# Patient Record
Sex: FEMALE | Race: WHITE | NOT HISPANIC OR LATINO | URBAN - METROPOLITAN AREA
[De-identification: names, ages, dates, MRNs, and addresses within clinical notes are randomized per-mention and may not be internally consistent; named-entity substitution may affect disease eponyms.]

---

## 2017-08-04 ENCOUNTER — APPOINTMENT (RX ONLY)
Dept: URBAN - METROPOLITAN AREA CLINIC 143 | Facility: CLINIC | Age: 57
Setting detail: DERMATOLOGY
End: 2017-08-04

## 2017-08-04 DIAGNOSIS — L71.8 OTHER ROSACEA: ICD-10-CM

## 2017-08-04 PROBLEM — I63.50 CEREBRAL INFARCTION DUE TO UNSPECIFIED OCCLUSION OR STENOSIS OF UNSPECIFIED CEREBRAL ARTERY: Status: ACTIVE | Noted: 2017-08-04

## 2017-08-04 PROBLEM — F41.9 ANXIETY DISORDER, UNSPECIFIED: Status: ACTIVE | Noted: 2017-08-04

## 2017-08-04 PROCEDURE — ? IN-HOUSE DISPENSING PHARMACY

## 2017-08-04 PROCEDURE — ? COUNSELING

## 2017-08-04 PROCEDURE — ? PRESCRIPTION

## 2017-08-04 PROCEDURE — 99202 OFFICE O/P NEW SF 15 MIN: CPT

## 2017-08-04 RX ORDER — IVERMECTIN 10 MG/G
CREAM TOPICAL QD
Qty: 1 | Refills: 1 | Status: ERX | COMMUNITY
Start: 2017-08-04

## 2017-08-04 RX ADMIN — IVERMECTIN: 10 CREAM TOPICAL at 21:40

## 2017-08-04 ASSESSMENT — LOCATION ZONE DERM: LOCATION ZONE: FACE

## 2017-08-04 ASSESSMENT — LOCATION SIMPLE DESCRIPTION DERM: LOCATION SIMPLE: LEFT CHEEK

## 2017-08-04 ASSESSMENT — LOCATION DETAILED DESCRIPTION DERM: LOCATION DETAILED: LEFT INFERIOR CENTRAL MALAR CHEEK

## 2017-08-04 NOTE — PROCEDURE: IN-HOUSE DISPENSING PHARMACY
Product 23 Units Dispensed: 0
Product 28 Unit Type: mg
Product 1 Application Directions: AAA QD
Product 1 Price/Unit (In Dollars): 40.00
Detail Level: Zone
Name Of Product 1: Rosacea cream
Send Charges To Patient Encounter: Yes
Product 1 Units Dispensed: 1

## 2017-08-04 NOTE — PROCEDURE: MIPS QUALITY
Quality 111:Pneumonia Vaccination Status For Older Adults: Pneumococcal Vaccination not Administered or Previously Received, Reason not Otherwise Specified
Quality 131: Pain Assessment And Follow-Up: Pain assessment using a standardized tool is documented as negative, no follow-up plan required
Quality 226: Preventive Care And Screening: Tobacco Use: Screening And Cessation Intervention: Patient screened for tobacco and never smoked
Quality 154 Part A: Falls: Risk Assessment (Should Be Reported With Measure 155.): Falls risk assessment completed and documented in the past 12 months.
Quality 431: Preventive Care And Screening: Unhealthy Alcohol Use - Screening: Patient screened for unhealthy alcohol use using a single question and scores less than 2 times per year
Detail Level: Detailed
Quality 110: Preventive Care And Screening: Influenza Immunization: Influenza Immunization Ordered or Recommended, but not Administered due to system reason
Quality 154 Part B: Falls: Risk Screening (Should Be Reported With Measure 155.): Patient screened for future fall risk; documentation of no falls in the past year or only one fall without injury in the past year
Quality 155 (Denominator): Falls Plan Of Care: Plan of Care not Documented, Reason not Otherwise Specified
Quality 47: Advance Care Plan: Advance Care Planning discussed and documented in the medical record; patient did not wish or was not able to name a surrogate decision maker or provide an advance care plan.

## 2021-03-17 ENCOUNTER — NEW PATIENT (OUTPATIENT)
Dept: URBAN - NONMETROPOLITAN AREA CLINIC 13 | Facility: CLINIC | Age: 61
End: 2021-03-17
Payer: COMMERCIAL

## 2021-03-17 DIAGNOSIS — H52.223 REGULAR ASTIGMATISM, BILATERAL: Primary | ICD-10-CM

## 2021-03-17 PROCEDURE — 92004 COMPRE OPH EXAM NEW PT 1/>: CPT | Performed by: OPTOMETRIST

## 2021-03-17 ASSESSMENT — VISUAL ACUITY
OS: 20/30
OD: 20/25

## 2021-03-17 ASSESSMENT — INTRAOCULAR PRESSURE
OS: 17
OD: 17

## 2021-09-02 ENCOUNTER — OFFICE VISIT (OUTPATIENT)
Dept: URBAN - NONMETROPOLITAN AREA CLINIC 13 | Facility: CLINIC | Age: 61
End: 2021-09-02
Payer: COMMERCIAL

## 2021-09-02 DIAGNOSIS — H50.9 STRABISMUS: ICD-10-CM

## 2021-09-02 DIAGNOSIS — H25.12 AGE-RELATED NUCLEAR CATARACT, LEFT EYE: ICD-10-CM

## 2021-09-02 DIAGNOSIS — H35.363 DRUSEN (DEGENERATIVE) OF MACULA, BILATERAL: ICD-10-CM

## 2021-09-02 DIAGNOSIS — H04.123 TEAR FILM INSUFFICIENCY OF BILATERAL LACRIMAL GLANDS: ICD-10-CM

## 2021-09-02 PROCEDURE — 99204 OFFICE O/P NEW MOD 45 MIN: CPT | Performed by: OPHTHALMOLOGY

## 2021-09-02 ASSESSMENT — VISUAL ACUITY
OD: 20/30
OS: 20/30

## 2021-09-02 ASSESSMENT — INTRAOCULAR PRESSURE
OS: 18
OD: 17

## 2021-10-11 ENCOUNTER — ADULT PHYSICAL (OUTPATIENT)
Dept: URBAN - NONMETROPOLITAN AREA CLINIC 13 | Facility: CLINIC | Age: 61
End: 2021-10-11
Payer: COMMERCIAL

## 2021-10-11 DIAGNOSIS — Z01.818 ENCOUNTER FOR OTHER PREPROCEDURAL EXAMINATION: Primary | ICD-10-CM

## 2021-10-11 DIAGNOSIS — H25.13 AGE-RELATED NUCLEAR CATARACT, BILATERAL: ICD-10-CM

## 2021-10-11 PROCEDURE — 99203 OFFICE O/P NEW LOW 30 MIN: CPT | Performed by: PHYSICIAN ASSISTANT

## 2021-10-18 ENCOUNTER — SURGERY (OUTPATIENT)
Dept: URBAN - NONMETROPOLITAN AREA SURGERY 4 | Facility: SURGERY | Age: 61
End: 2021-10-18
Payer: COMMERCIAL

## 2021-10-18 PROCEDURE — 66984 XCAPSL CTRC RMVL W/O ECP: CPT | Performed by: OPHTHALMOLOGY

## 2021-10-19 ENCOUNTER — POST-OPERATIVE VISIT (OUTPATIENT)
Dept: URBAN - NONMETROPOLITAN AREA CLINIC 13 | Facility: CLINIC | Age: 61
End: 2021-10-19

## 2021-10-19 PROCEDURE — 99024 POSTOP FOLLOW-UP VISIT: CPT | Performed by: OPTOMETRIST

## 2021-10-19 ASSESSMENT — INTRAOCULAR PRESSURE
OS: 19
OD: 19

## 2021-10-26 ENCOUNTER — POST-OPERATIVE VISIT (OUTPATIENT)
Dept: URBAN - NONMETROPOLITAN AREA CLINIC 13 | Facility: CLINIC | Age: 61
End: 2021-10-26

## 2021-10-26 PROCEDURE — 99024 POSTOP FOLLOW-UP VISIT: CPT | Performed by: OPTOMETRIST

## 2021-10-26 ASSESSMENT — VISUAL ACUITY
OD: 20/20
OS: 20/30

## 2021-10-26 ASSESSMENT — INTRAOCULAR PRESSURE
OS: 19
OD: 18

## 2021-10-26 NOTE — IMPRESSION/PLAN
Impression: S/P Cataract Extraction by phacoemulsification with IOL placement OD - 8 Days. Encounter for surgical aftercare following surgery on a sense organ  Z48.810.  Plan: ok for second eye

## 2021-11-01 ENCOUNTER — SURGERY (OUTPATIENT)
Dept: URBAN - NONMETROPOLITAN AREA SURGERY 4 | Facility: SURGERY | Age: 61
End: 2021-11-01
Payer: COMMERCIAL

## 2021-11-01 PROCEDURE — 66984 XCAPSL CTRC RMVL W/O ECP: CPT | Performed by: OPHTHALMOLOGY

## 2021-11-02 ENCOUNTER — POST-OPERATIVE VISIT (OUTPATIENT)
Dept: URBAN - NONMETROPOLITAN AREA CLINIC 13 | Facility: CLINIC | Age: 61
End: 2021-11-02

## 2021-11-02 PROCEDURE — 99024 POSTOP FOLLOW-UP VISIT: CPT | Performed by: OPTOMETRIST

## 2021-11-02 ASSESSMENT — INTRAOCULAR PRESSURE
OD: 19
OS: 19

## 2021-11-02 NOTE — IMPRESSION/PLAN
Impression: S/P Cataract Extraction by phacoemulsification with IOL placement OS - 1 Day. Encounter for surgical aftercare following surgery on a sense organ  Z48.770.  Plan:

## 2021-11-10 ENCOUNTER — POST-OPERATIVE VISIT (OUTPATIENT)
Dept: URBAN - NONMETROPOLITAN AREA CLINIC 13 | Facility: CLINIC | Age: 61
End: 2021-11-10
Payer: COMMERCIAL

## 2021-11-10 DIAGNOSIS — Z96.1 PRESENCE OF INTRAOCULAR LENS: Primary | ICD-10-CM

## 2021-11-10 PROCEDURE — 99024 POSTOP FOLLOW-UP VISIT: CPT | Performed by: OPTOMETRIST

## 2021-11-10 ASSESSMENT — INTRAOCULAR PRESSURE
OS: 17
OD: 17

## 2021-11-10 ASSESSMENT — VISUAL ACUITY
OS: 20/25
OD: 20/25+

## 2021-11-10 NOTE — IMPRESSION/PLAN
Impression: S/P Cataract Extraction by phacoemulsification with IOL placement OS - 9 Days. Presence of intraocular lens  Z96.1. Post operative instructions reviewed - Plan: dexy used, no gtts, tears daily, pt ed on s/s of infection/rd/high iop need to rtc asap. monitor x 4 week. refract next visit.

## 2021-12-07 ENCOUNTER — POST-OPERATIVE VISIT (OUTPATIENT)
Dept: URBAN - NONMETROPOLITAN AREA CLINIC 13 | Facility: CLINIC | Age: 61
End: 2021-12-07

## 2021-12-07 DIAGNOSIS — H52.4 PRESBYOPIA: ICD-10-CM

## 2021-12-07 PROCEDURE — 99024 POSTOP FOLLOW-UP VISIT: CPT | Performed by: OPTOMETRIST

## 2021-12-07 ASSESSMENT — INTRAOCULAR PRESSURE
OD: 15
OS: 15

## 2021-12-07 ASSESSMENT — VISUAL ACUITY
OS: 20/25+
OD: 20/25

## 2021-12-07 NOTE — IMPRESSION/PLAN
Impression: Encounter for surgical aftercare following surgery on a sense organ: Z48.810 OS. Plan: Recommend glasses for best vision. A copy of the new rx was given to patient.

## 2022-01-03 ENCOUNTER — POST-OPERATIVE VISIT (OUTPATIENT)
Dept: URBAN - NONMETROPOLITAN AREA CLINIC 13 | Facility: CLINIC | Age: 62
End: 2022-01-03

## 2022-01-03 DIAGNOSIS — Z48.810 ENCOUNTER FOR SURGICAL AFTERCARE FOLLOWING SURGERY ON A SENSE ORGAN: Primary | ICD-10-CM

## 2022-01-03 PROCEDURE — 99024 POSTOP FOLLOW-UP VISIT: CPT | Performed by: OPTOMETRIST

## 2022-01-03 ASSESSMENT — VISUAL ACUITY: OD: 20/25

## 2022-01-03 NOTE — IMPRESSION/PLAN
Impression:  Encounter for surgical aftercare following surgery on a sense organ  Z48.810.  Plan: no change in rx, ppu, consider prism, ppu

## 2022-01-10 ENCOUNTER — POST-OPERATIVE VISIT (OUTPATIENT)
Dept: URBAN - NONMETROPOLITAN AREA CLINIC 13 | Facility: CLINIC | Age: 62
End: 2022-01-10

## 2022-01-10 PROCEDURE — 99024 POSTOP FOLLOW-UP VISIT: CPT | Performed by: OPTOMETRIST

## 2022-01-10 ASSESSMENT — VISUAL ACUITY
OS: 20/25
OD: 20/25

## 2022-01-10 ASSESSMENT — INTRAOCULAR PRESSURE
OS: 16
OD: 16

## 2022-01-10 NOTE — IMPRESSION/PLAN
Impression: S/P Cataract Extraction by phacoemulsification with IOL placement OS - 70 Days. Encounter for surgical aftercare following surgery on a sense organ  Z48.810.  Plan: recheck 2 weeks for prism follow up

## 2022-06-14 ENCOUNTER — OFFICE VISIT (OUTPATIENT)
Dept: URBAN - NONMETROPOLITAN AREA CLINIC 13 | Facility: CLINIC | Age: 62
End: 2022-06-14
Payer: COMMERCIAL

## 2022-06-14 DIAGNOSIS — H18.222 IDIOPATHIC CORNEAL EDEMA, LEFT EYE: Primary | ICD-10-CM

## 2022-06-14 DIAGNOSIS — H04.123 DRY EYE SYNDROME OF BILATERAL LACRIMAL GLANDS: ICD-10-CM

## 2022-06-14 PROCEDURE — 92134 CPTRZ OPH DX IMG PST SGM RTA: CPT | Performed by: OPTOMETRIST

## 2022-06-14 PROCEDURE — 99213 OFFICE O/P EST LOW 20 MIN: CPT | Performed by: OPTOMETRIST

## 2022-06-14 RX ORDER — PREDNISOLONE ACETATE 10 MG/ML
1 % SUSPENSION/ DROPS OPHTHALMIC
Qty: 5 | Refills: 1 | Status: ACTIVE
Start: 2022-06-14

## 2022-06-14 ASSESSMENT — VISUAL ACUITY
OS: 20/30
OD: 20/20

## 2022-06-14 ASSESSMENT — INTRAOCULAR PRESSURE
OS: 18
OD: 16

## 2022-06-14 NOTE — IMPRESSION/PLAN
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 Plan: Start Pred Forte 3x daily. FU in 1 week.

## 2022-06-20 ENCOUNTER — OFFICE VISIT (OUTPATIENT)
Dept: URBAN - NONMETROPOLITAN AREA CLINIC 13 | Facility: CLINIC | Age: 62
End: 2022-06-20
Payer: COMMERCIAL

## 2022-06-20 DIAGNOSIS — H04.123 DRY EYE SYNDROME OF BILATERAL LACRIMAL GLANDS: ICD-10-CM

## 2022-06-20 DIAGNOSIS — H18.222 IDIOPATHIC CORNEAL EDEMA, LEFT EYE: Primary | ICD-10-CM

## 2022-06-20 PROCEDURE — 99212 OFFICE O/P EST SF 10 MIN: CPT | Performed by: OPTOMETRIST

## 2022-06-20 ASSESSMENT — INTRAOCULAR PRESSURE
OD: 16
OS: 16

## 2022-06-20 NOTE — IMPRESSION/PLAN
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 Plan: Continue Pred 3x daily then taper to 2x daily. Return in 2 weeks.

## 2022-07-05 ENCOUNTER — OFFICE VISIT (OUTPATIENT)
Dept: URBAN - NONMETROPOLITAN AREA CLINIC 13 | Facility: CLINIC | Age: 62
End: 2022-07-05
Payer: COMMERCIAL

## 2022-07-05 DIAGNOSIS — H26.493 OTHER SECONDARY CATARACT, BILATERAL: ICD-10-CM

## 2022-07-05 DIAGNOSIS — H18.222 IDIOPATHIC CORNEAL EDEMA, LEFT EYE: Primary | ICD-10-CM

## 2022-07-05 DIAGNOSIS — H04.123 DRY EYE SYNDROME OF BILATERAL LACRIMAL GLANDS: ICD-10-CM

## 2022-07-05 PROCEDURE — 99212 OFFICE O/P EST SF 10 MIN: CPT | Performed by: OPTOMETRIST

## 2022-07-05 ASSESSMENT — INTRAOCULAR PRESSURE
OD: 13
OS: 13

## 2022-07-05 NOTE — IMPRESSION/PLAN
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 Plan: Continue Pred Forte 1x daily for 1 week then discontinue. Return in 6 months.

## 2022-07-05 NOTE — IMPRESSION/PLAN
Impression: Other secondary cataract, bilateral: H26.493. Plan: Recheck in 6 months or prn if changes are noted.

## 2022-12-08 ENCOUNTER — OFFICE VISIT (OUTPATIENT)
Dept: URBAN - NONMETROPOLITAN AREA CLINIC 13 | Facility: CLINIC | Age: 62
End: 2022-12-08
Payer: COMMERCIAL

## 2022-12-08 DIAGNOSIS — H18.222 IDIOPATHIC CORNEAL EDEMA, LEFT EYE: ICD-10-CM

## 2022-12-08 DIAGNOSIS — H04.123 DRY EYE SYNDROME OF BILATERAL LACRIMAL GLANDS: ICD-10-CM

## 2022-12-08 DIAGNOSIS — H52.4 PRESBYOPIA: Primary | ICD-10-CM

## 2022-12-08 PROCEDURE — 92012 INTRM OPH EXAM EST PATIENT: CPT | Performed by: OPTOMETRIST

## 2022-12-08 ASSESSMENT — INTRAOCULAR PRESSURE
OD: 12
OS: 12

## 2022-12-08 ASSESSMENT — VISUAL ACUITY
OD: 20/20
OS: 20/25

## 2023-05-04 ENCOUNTER — OFFICE VISIT (OUTPATIENT)
Dept: URBAN - NONMETROPOLITAN AREA CLINIC 13 | Facility: CLINIC | Age: 63
End: 2023-05-04
Payer: MEDICARE

## 2023-05-04 DIAGNOSIS — H04.123 DRY EYE SYNDROME OF BILATERAL LACRIMAL GLANDS: ICD-10-CM

## 2023-05-04 DIAGNOSIS — H18.222 IDIOPATHIC CORNEAL EDEMA, LEFT EYE: Primary | ICD-10-CM

## 2023-05-04 DIAGNOSIS — H52.4 PRESBYOPIA: ICD-10-CM

## 2023-05-04 DIAGNOSIS — H26.493 OTHER SECONDARY CATARACT, BILATERAL: ICD-10-CM

## 2023-05-04 PROCEDURE — 99214 OFFICE O/P EST MOD 30 MIN: CPT | Performed by: OPTOMETRIST

## 2023-05-04 ASSESSMENT — INTRAOCULAR PRESSURE
OD: 16
OS: 12

## 2023-05-04 ASSESSMENT — VISUAL ACUITY
OD: 20/25
OS: 20/30

## 2023-05-04 NOTE — IMPRESSION/PLAN
Impression: Presbyopia: H52.4. Plan: Recommend glasses for best vision. A copy of the new rx was given to the patient. RTC as above plan.

## 2023-05-04 NOTE — IMPRESSION/PLAN
Impression: Dry eye syndrome of bilateral lacrimal glands: H04.123. Plan: Consistent use of artificial tears is indicated for dryness noted. RTC annually.

## 2023-05-04 NOTE — IMPRESSION/PLAN
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 Plan: OCT shows mild RPE changes, no treatment indicated for now, observe. Pt will RTC for annual CEE,w DFE, prn if changes noted. Advised continuous intake of AREDS as directed.

## 2023-05-04 NOTE — IMPRESSION/PLAN
Impression: Other secondary cataract, bilateral: H26.493. Plan: Still no treatment indicated for now, observe. RTC as above plan.

## 2024-03-28 NOTE — IMPRESSION/PLAN
Impression: Age-related nuclear cataract, bilateral: H25.13. Plan: Discussed diagnosis in detail with patient. Discussed treatment options with patient. Patient elects to have surgery. Surgical risks and benefits were discussed, explained and understood by patient.  OD then OS std iol aim -2.5 od -2.5 os ok dexycu
Impression: Drusen (degenerative) of macula, bilateral: H35.363.  Plan: may be early amd observe
Impression: Strabismus: H50.9.  Plan: longstanding observe
Impression: Tear film insufficiency of bilateral lacrimal glands Plan:  Tears daily OU.
6 weeks

## 2025-07-17 ENCOUNTER — OFFICE VISIT (OUTPATIENT)
Dept: URBAN - NONMETROPOLITAN AREA CLINIC 13 | Facility: CLINIC | Age: 65
End: 2025-07-17
Payer: MEDICARE

## 2025-07-17 DIAGNOSIS — H35.363 DRUSEN (DEGENERATIVE) OF MACULA, BILATERAL: Primary | ICD-10-CM

## 2025-07-17 DIAGNOSIS — H04.123 DRY EYE SYNDROME OF BILATERAL LACRIMAL GLANDS: ICD-10-CM

## 2025-07-17 PROCEDURE — 99214 OFFICE O/P EST MOD 30 MIN: CPT | Performed by: OPTOMETRIST

## 2025-07-17 ASSESSMENT — VISUAL ACUITY
OS: 20/30
OD: 20/40

## 2025-07-17 ASSESSMENT — INTRAOCULAR PRESSURE
OS: 12
OD: 14